# Patient Record
Sex: MALE | Employment: FULL TIME | URBAN - METROPOLITAN AREA
[De-identification: names, ages, dates, MRNs, and addresses within clinical notes are randomized per-mention and may not be internally consistent; named-entity substitution may affect disease eponyms.]

---

## 2020-12-07 ENCOUNTER — TELEPHONE (OUTPATIENT)
Dept: SURGERY | Facility: CLINIC | Age: 50
End: 2020-12-07

## 2020-12-07 NOTE — TELEPHONE ENCOUNTER
----- Message from Mary Herbert sent at 12/7/2020 10:45 AM CST -----  Type:  Patient Returning Call    Who Called:pt  Who Left Message for Patient:nurse  Does the patient know what this is regarding?:gastric bypass  Would the patient rather a call back or a response via [facility]? Call back  Best Call Back Number:[phone]  Additional Information: #    Thank you

## 2020-12-07 NOTE — TELEPHONE ENCOUNTER
Returned call informed pt Gastric Bypass is not perform  here @ our [facility] location but @ main campus/[facility]. Pt voiced an understanding

## 2020-12-07 NOTE — TELEPHONE ENCOUNTER
----- Message from Radha Razo sent at 12/7/2020 10:01 AM CST -----  Regarding: referral information sent over for procedure discussion  Type:  Needs Medical Advice    Who Called: pt  Symptoms (please be specific): n/a   How long has patient had these symptoms:  n/a  Pharmacy name and phone #:  n/a  Would the patient rather a call back or a response via [facility]? Call back   Best Call Back Number: [phone]  Additional Information: Pt is calling in regards to checking status of referral information for consult. Please call back.thanks

## 2020-12-15 ENCOUNTER — TELEPHONE (OUTPATIENT)
Dept: SURGERY | Facility: CLINIC | Age: 50
End: 2020-12-15

## 2020-12-15 NOTE — TELEPHONE ENCOUNTER
----- Message from Antonina Elliott sent at 12/15/2020 12:29 PM CST -----  Contact: Akil Harris called to schedule appt. Please call back at [phone]. Thanks jh

## 2020-12-15 NOTE — TELEPHONE ENCOUNTER
Returned call, pt states, he was calling in ref: referral for knee replacement. Informed pt he needs to speak to someone in Ortho. During conversation advised pt this call will be placed on a brief hold and transfer to the correct dept, pt agreed and call was successfully transferred.